# Patient Record
Sex: FEMALE | Race: WHITE | Employment: STUDENT
[De-identification: names, ages, dates, MRNs, and addresses within clinical notes are randomized per-mention and may not be internally consistent; named-entity substitution may affect disease eponyms.]

---

## 2017-06-10 ENCOUNTER — HEALTH MAINTENANCE LETTER (OUTPATIENT)
Age: 22
End: 2017-06-10

## 2017-09-14 ENCOUNTER — OFFICE VISIT (OUTPATIENT)
Dept: FAMILY MEDICINE | Facility: CLINIC | Age: 22
End: 2017-09-14

## 2017-09-14 VITALS
HEART RATE: 74 BPM | BODY MASS INDEX: 27.92 KG/M2 | WEIGHT: 183.6 LBS | TEMPERATURE: 98.6 F | OXYGEN SATURATION: 98 % | DIASTOLIC BLOOD PRESSURE: 76 MMHG | SYSTOLIC BLOOD PRESSURE: 127 MMHG | RESPIRATION RATE: 20 BRPM

## 2017-09-14 DIAGNOSIS — M79.675 TOE PAIN, LEFT: Primary | ICD-10-CM

## 2017-09-14 RX ORDER — NAPROXEN 500 MG/1
500 TABLET ORAL 2 TIMES DAILY WITH MEALS
Qty: 10 TABLET | Refills: 0 | Status: SHIPPED | OUTPATIENT
Start: 2017-09-14 | End: 2017-09-19

## 2017-09-14 ASSESSMENT — ENCOUNTER SYMPTOMS
FEVER: 0
CHILLS: 0
NUMBNESS: 0
ACTIVITY CHANGE: 0

## 2017-09-14 NOTE — PROGRESS NOTES
HPI:       Kenia De La Torre is a 22 year old who presents for the following  Patient presents with:  Toe Pain: Left great toe,inter,sharp, 1month    Kenia has had 1 month of pain around the left big toe, at the base of the toe and into the first knuckle. She has sharp, 6 out of 10 severity pain when walking or when dorsiflexing the toe, and a less severe, aching pain at night after having walked a lot. No history of trauma to this area, though she does walk a lot, typically 4-5 miles a day. She has tried taking ibuprofen, about once every three days, and this has helped somewhat with the aching pain but not with the sharper pain while walking. She's been walking less in the last couple of days, and the pain has gotten better. She has no personal or family history of joint disease other than a grandmother with bunions, and no history of psoriasis. She denies fever/chills, numbness/tingling, redness or swelling, and pain in any other joints.    Joint/Muscle Pain    Onset: 1 month ago    Injury?  no    Description:   Location(s): Left big toe 1st metatarsophalageal   Character: Sharp (when walking) and Dull ache    Intensity: moderate 6 at the worst, currently 0 or 1    Progression of Symptoms: got worse over the course of the month, better in the last couple days because she has decreased her walking    Accompanying Signs & Symptoms:  Other symptoms: none    History:   Previous similar pain: no      Worsened by    Overuse?: Yes Details: worse with walking    Morning Stiffness?:no, more aching at night    Alleviating factors:  Improved by: rest/inactivity and Ibuprofen  Therapies Tried and outcome: Ibuprofen, Details: Has helped with the aching pain after overuse, no effect on sharp pain during use    Problem, Medication and Allergy Lists were reviewed and are current.  Patient is an established patient of this clinic.         Review of Systems:   Review of Systems   Constitutional: Negative for  activity change, chills and fever.   HENT: Negative for congestion.    Skin: Negative for rash.   Neurological: Negative for numbness.             Physical Exam:     Patient Vitals for the past 24 hrs:   BP Temp Temp src Pulse Resp SpO2 Weight   09/14/17 1556 127/76 98.6  F (37  C) Oral 74 20 98 % 183 lb 9.6 oz (83.3 kg)     Body mass index is 27.92 kg/(m^2).  Vitals were reviewed and were normal     Physical Exam   Constitutional: She is oriented to person, place, and time. No distress.   Cardiovascular: Normal rate and regular rhythm.    Pulmonary/Chest: Effort normal. No respiratory distress.   Neurological: She is alert and oriented to person, place, and time.   Skin: Skin is warm and dry. No rash noted. No erythema.   Psychiatric: She has a normal mood and affect.      Left Ankle Exam   Swelling: None.    Range of Motion   Normal left ankle ROM  Dorsiflexion:     Normal  Plantar flexion: Normal  Inversion:         Normal  Eversion:         Normal    Muscle Strength   Normal left ankle strength      Left Great Toe Exam:  No gross deformities, no swelling or erythema, no joint effusion; normal range of motion and strength; no tenderness to palpation but some pain elicited on dorsiflexion        Results:     None    Assessment and Plan     ## Toe pain, left  Kenia presents with pain of the left great toe. This is likely due to overuse, and possibly tendinitis. Other possibilities would include gout or an infectious or rheumatologic arthropathy, but her physical exam findings, lack of associated symptoms or history, and young age make these very unlikely.  We prescribed a 1 week course of scheduled naproxen to hopefully alleviate some inflammation in the toe. If her symptoms do not improve, she will return to clinic for follow up and further evaluation; may consider imaging and lab work at that time.    - naproxen (NAPROSYN) 500 MG tablet; Take 1 tablet (500 mg) by mouth 2 times daily (with meals) for 5 days   Dispense: 10 tablet; Refill: 0    There are no discontinued medications.  Options for treatment and follow-up care were reviewed with the patient. Kenia De La Torre  engaged in the decision making process and verbalized understanding of the options discussed and agreed with the final plan.    This note is scribed by Leydi Cartwright MS3, on behalf of Dr. Bailon.    The medical student acted as scribe and the encounter documented above was completely performed by myself and the documentation reflects the work I have performed today.    Flora Bailon MD  [facility],  PGY-3 Family Medicine Resident  #[id number]

## 2017-09-14 NOTE — MR AVS SNAPSHOT
After Visit Summary   9/14/2017    Kenia De La Torre    MRN: [id number]           Patient Information     Date Of Birth          1995        Visit Information        Provider Department      9/14/2017 3:40 PM Flora Bailon MD [facility]        Today's Diagnoses     Toe pain, left    -  1      Care Instructions    Here is the plan from today's visit    1. Toe pain, left  - naproxen (NAPROSYN) 500 MG tablet; Take 1 tablet (500 mg) by mouth 2 times daily (with meals) for 5 days  Dispense: 10 tablet; Refill: 0    Please call or return to clinic if your symptoms don't go away.    Follow up plan  1 week with Dr. Bailon.    Thank you for coming to [facility] today.  Lab Testing:  **If you had lab testing today and your results are reassuring or normal they will be mailed to you or sent through [employer] within 7 days.   **If the lab tests need quick action we will call you with the results.  The phone number we will call with results is # [phone] (home) . If this is not the best number please call our clinic and change the number.  Medication Refills:  If you need any refills please call your pharmacy and they will contact us.   If you need to  your refill at a new pharmacy, please contact the new pharmacy directly. The new pharmacy will help you get your medications transferred faster.   Scheduling:  If you have any concerns about today's visit or wish to schedule another appointment please call our office during normal business hours [phone] (8-5:00 M-F)  If a referral was made to a [facility] and you don't get a call from central scheduling please call [phone].  If a Mammogram was ordered for you at [facility] call [phone] to schedule or change your appointment.  If you had an XRay/CT/Ultrasound/MRI ordered the number is [phone] to schedule or change your radiology appointment.   Medical  Concerns:  If you have urgent medical concerns please call [phone] at any time of the day.            Follow-ups after your visit        Who to contact     Please call your clinic at [phone] to:    Ask questions about your health    Make or cancel appointments    Discuss your medicines    Learn about your test results    Speak to your doctor   If you have compliments or concerns about an experience at your clinic, or if you wish to file a complaint, please contact [facility] Patient Relations at [phone] or email us at [email or web address]         Additional Information About Your Visit        [employer]olution[employer]FO[employer] Information     Kiddy is an electronic gateway that provides easy, online access to your medical records. With Kiddy, you can request a clinic appointment, read your test results, renew a prescription or communicate with your care team.     To sign up for Kiddy visit the website at [email or web address]   You will be asked to enter the access code listed below, as well as some personal information. Please follow the directions to create your username and password.     Your access code is: [email or web address]  Expires: 2017  4:20 PM     Your access code will  in 90 days. If you need help or a new code, please contact your [facility] or call [phone] for assistance.        Care EveryWhere ID     This is your Care EveryWhere ID. This could be used by other organizations to access your [facility] medical records  [id number]        Your Vitals Were     Pulse Temperature Respirations Pulse Oximetry BMI (Body Mass Index)       74 98.6  F (37  C) (Oral) 20 98% 27.92 kg/m2        Blood Pressure from Last 3 Encounters:   17 127/76   01/27/15 114/73   14 127/77    Weight from Last 3 Encounters:   17 183 lb 9.6 oz (83.3 kg)   01/27/15 176 lb 6.4 oz (80 kg) (94 %)*   14 180 lb (81.6 kg) (95 %)*     *  Growth percentiles are based on [place] 2-20 Years data.              Today, you had the following     No orders found for display         Today's Medication Changes          These changes are accurate as of: 9/14/17  4:20 PM.  If you have any questions, ask your nurse or doctor.               Start taking these medicines.        Dose/Directions    naproxen 500 MG tablet   Commonly known as:  NAPROSYN   Used for:  Toe pain, left   Started by:  Flora Bailon MD        Dose:  500 mg   Take 1 tablet (500 mg) by mouth 2 times daily (with meals) for 5 days   Quantity:  10 tablet   Refills:  0            Where to get your medicines      These medications were sent to [id number] - [place], [place] - [address] AT [facility] [place] [address]  [address], [place] [address]    Hours:  24-hours Phone:  [phone]     naproxen 500 MG tablet                Primary Care Provider Office Phone # Fax #    Flora Bailon [phone]       [facility] [address] [facility] [address]        Equal Access to Services     ESTHER BOSTON AH: Hadii eh wheatley Soelsa, waaxda lumayank, qaybta kaalmanubia guardado, jose angel sosa . So [facility] [phone].    ATENCIÓN: Si habla español, tiene a castro disposición servicios gratkatianaos de asistencia lingüística. [facility] [phone].    We comply with applicable federal civil rights laws and [place] laws. We do not discriminate on the basis of race, color, national origin, age, disability sex, sexual orientation or gender identity.            Thank you!     Thank you for choosing [facility]  for your care. Our goal is always to provide you with excellent care. Hearing back from our patients is one way we can continue to improve our services. Please take a few minutes to complete the written survey that you may receive in the mail after your visit with us. Thank you!              Your Updated Medication List - Protect others around you: Learn how to safely use, store and throw away your medicines at www.disposemymeds.org.          This list is accurate as of: 9/14/17  4:20 PM.  Always use your most recent med list.                   Brand Name Dispense Instructions for use Diagnosis    drospirenone-ethinyl estradiol 3-0.02 MG per tablet    LEIGHTON     Take 1 tablet by mouth daily        naproxen 500 MG tablet    NAPROSYN    10 tablet    Take 1 tablet (500 mg) by mouth 2 times daily (with meals) for 5 days    Toe pain, left

## 2017-09-14 NOTE — PROGRESS NOTES
Preceptor Attestation:   Patient seen and discussed with the resident.   Assessment and plan reviewed with resident and agreed upon.   Supervising Physician:  Antoine Castellano MD  [facility]

## 2017-09-14 NOTE — PATIENT INSTRUCTIONS
Here is the plan from today's visit    1. Toe pain, left  - naproxen (NAPROSYN) 500 MG tablet; Take 1 tablet (500 mg) by mouth 2 times daily (with meals) for 5 days  Dispense: 10 tablet; Refill: 0    Please call or return to clinic if your symptoms don't go away.    Follow up plan  1 week with Dr. Bailon.    Thank you for coming to [facility] today.  Lab Testing:  **If you had lab testing today and your results are reassuring or normal they will be mailed to you or sent through [employer] within 7 days.   **If the lab tests need quick action we will call you with the results.  The phone number we will call with results is # [phone] (home) . If this is not the best number please call our clinic and change the number.  Medication Refills:  If you need any refills please call your pharmacy and they will contact us.   If you need to  your refill at a new pharmacy, please contact the new pharmacy directly. The new pharmacy will help you get your medications transferred faster.   Scheduling:  If you have any concerns about today's visit or wish to schedule another appointment please call our office during normal business hours [phone] (8-5:00 M-F)  If a referral was made to a [facility] and you don't get a call from central scheduling please call [phone].  If a Mammogram was ordered for you at [facility] call [phone] to schedule or change your appointment.  If you had an XRay/CT/Ultrasound/MRI ordered the number is [phone] to schedule or change your radiology appointment.   Medical Concerns:  If you have urgent medical concerns please call [phone] at any time of the day.

## 2017-09-15 PROBLEM — M79.675 PAIN OF TOE OF LEFT FOOT: Status: ACTIVE | Noted: 2017-09-15

## 2020-01-28 ENCOUNTER — OFFICE VISIT - HEALTHEAST (OUTPATIENT)
Dept: FAMILY MEDICINE | Facility: CLINIC | Age: 25
End: 2020-01-28

## 2020-01-28 DIAGNOSIS — F33.8 SEASONAL AFFECTIVE DISORDER (H): ICD-10-CM

## 2020-01-28 ASSESSMENT — ANXIETY QUESTIONNAIRES
6. BECOMING EASILY ANNOYED OR IRRITABLE: SEVERAL DAYS
IF YOU CHECKED OFF ANY PROBLEMS ON THIS QUESTIONNAIRE, HOW DIFFICULT HAVE THESE PROBLEMS MADE IT FOR YOU TO DO YOUR WORK, TAKE CARE OF THINGS AT HOME, OR GET ALONG WITH OTHER PEOPLE: SOMEWHAT DIFFICULT
1. FEELING NERVOUS, ANXIOUS, OR ON EDGE: MORE THAN HALF THE DAYS
5. BEING SO RESTLESS THAT IT IS HARD TO SIT STILL: NOT AT ALL
7. FEELING AFRAID AS IF SOMETHING AWFUL MIGHT HAPPEN: NOT AT ALL
2. NOT BEING ABLE TO STOP OR CONTROL WORRYING: MORE THAN HALF THE DAYS
3. WORRYING TOO MUCH ABOUT DIFFERENT THINGS: MORE THAN HALF THE DAYS
GAD7 TOTAL SCORE: 9
4. TROUBLE RELAXING: MORE THAN HALF THE DAYS

## 2020-01-28 ASSESSMENT — PATIENT HEALTH QUESTIONNAIRE - PHQ9: SUM OF ALL RESPONSES TO PHQ QUESTIONS 1-9: 6

## 2020-01-28 ASSESSMENT — MIFFLIN-ST. JEOR: SCORE: 1590.18

## 2020-05-24 ENCOUNTER — COMMUNICATION - HEALTHEAST (OUTPATIENT)
Dept: FAMILY MEDICINE | Facility: CLINIC | Age: 25
End: 2020-05-24

## 2020-05-24 DIAGNOSIS — F33.8 SEASONAL AFFECTIVE DISORDER (H): ICD-10-CM

## 2020-12-04 ENCOUNTER — COMMUNICATION - HEALTHEAST (OUTPATIENT)
Dept: FAMILY MEDICINE | Facility: CLINIC | Age: 25
End: 2020-12-04

## 2020-12-04 DIAGNOSIS — F33.8 SEASONAL AFFECTIVE DISORDER (H): ICD-10-CM

## 2021-05-27 ASSESSMENT — PATIENT HEALTH QUESTIONNAIRE - PHQ9: SUM OF ALL RESPONSES TO PHQ QUESTIONS 1-9: 6

## 2021-05-28 ASSESSMENT — ANXIETY QUESTIONNAIRES: GAD7 TOTAL SCORE: 9

## 2021-05-31 ENCOUNTER — RECORDS - HEALTHEAST (OUTPATIENT)
Dept: FAMILY MEDICINE | Facility: CLINIC | Age: 26
End: 2021-05-31

## 2021-06-04 VITALS
SYSTOLIC BLOOD PRESSURE: 121 MMHG | WEIGHT: 175.25 LBS | HEART RATE: 70 BPM | OXYGEN SATURATION: 100 % | HEIGHT: 68 IN | DIASTOLIC BLOOD PRESSURE: 70 MMHG | BODY MASS INDEX: 26.56 KG/M2

## 2021-06-05 NOTE — PROGRESS NOTES
"OFFICE VISIT - FAMILY MEDICINE     ASSESSMENT AND PLAN     1. Seasonal affective disorder (H)  sertraline (ZOLOFT) 50 MG tablet   Mild seasonal affective disorder disease, seems to be getting worse lately, we discussed different ways to improve her symptoms included medication, we did review possible side effect, family member seems to be responding to SSRI, especially sertraline, will start with a low dose, slowly increased as prescribed and as tolerated, have her follow-up in 6 weeks for reevaluation.  Depression action plan discussed and copy given to patient.    CHIEF COMPLAINT   Establish Care and Mental Health Problem (discuss mental health concerns with general depression. Doesn't feel good as she should be-seasonal.)    HPI   Kenia Torres is a 24 y.o. female.  No Patient Care Coordination Note on file.    Long-lasting history of mood disorder, primarily mild seasonal depression symptoms, usually manifest with overeating, difficulty sleeping, low energy, symptoms usually peaks  after the holiday season, in the middle of the winter; tends to improve in the late spring early summer, sometime associated with anxiety type symptoms warrant something worse will happen to her or her family.  She works in retail, usually busy during the holiday season; has never been on the medication before.  Has tried to do some walking 2 to 4 miles a days, does not seem to be helping anymore.  Family history is positive for depression, mom that and her brothers.  They are all taking sertraline with good response.  Mom also has diabetes type 1.  Dad has high blood pressure.  Patient is a non-smoker, drinks about 4 alcoholic per weeks, denies any illicit drug use.    Review of Systems As per HPI, otherwise negative.    OBJECTIVE   /70 (Patient Site: Left Arm, Patient Position: Sitting, Cuff Size: Adult Regular)   Pulse 70   Ht 5' 8.11\" (1.73 m)   Wt 175 lb 4 oz (79.5 kg)   SpO2 100%   BMI 26.56 kg/m  "   Physical Exam   Constitutional: She is oriented to person, place, and time. She appears well-developed and well-nourished.   HENT:   Head: Normocephalic and atraumatic.   Neck: Normal range of motion. Neck supple. No JVD present. No tracheal deviation present. No thyromegaly present.   Cardiovascular: Normal rate, regular rhythm, normal heart sounds and intact distal pulses. Exam reveals no gallop and no friction rub.   No murmur heard.  Pulmonary/Chest: Effort normal and breath sounds normal. No respiratory distress. She has no wheezes. She has no rales.   Musculoskeletal:         General: No tenderness or edema.   Lymphadenopathy:     She has no cervical adenopathy.   Neurological: She is alert and oriented to person, place, and time. Coordination normal.   Psychiatric: She has a normal mood and affect. Judgment and thought content normal.       PFSH   No family history on file.  Social History     Socioeconomic History     Marital status: Single     Spouse name: Not on file     Number of children: Not on file     Years of education: Not on file     Highest education level: Not on file   Occupational History     Not on file   Social Needs     Financial resource strain: Not on file     Food insecurity:     Worry: Not on file     Inability: Not on file     Transportation needs:     Medical: Not on file     Non-medical: Not on file   Tobacco Use     Smoking status: Never Smoker     Smokeless tobacco: Never Used   Substance and Sexual Activity     Alcohol use: Yes     Frequency: 4 or more times a week     Drug use: Never     Sexual activity: Not on file   Lifestyle     Physical activity:     Days per week: Not on file     Minutes per session: Not on file     Stress: Not on file   Relationships     Social connections:     Talks on phone: Not on file     Gets together: Not on file     Attends Orthodoxy service: Not on file     Active member of club or organization: Not on file     Attends meetings of clubs or  organizations: Not on file     Relationship status: Not on file     Intimate partner violence:     Fear of current or ex partner: Not on file     Emotionally abused: Not on file     Physically abused: Not on file     Forced sexual activity: Not on file   Other Topics Concern     Not on file   Social History Narrative     Not on file     Relevant history was reviewed with the patient today, unless noted in HPI, nothing is pertinent for this visit.  [facility]   There are no active problems to display for this patient.    No past surgical history on file.    RESULTS/CONSULTS (Lab/Rad)   No results found for this or any previous visit (from the past 168 hour(s)).  No results found.  MEDICATIONS     Current Outpatient Medications on File Prior to Visit   Medication Sig Dispense Refill     ergocalciferol, vitamin D2, (VITAMIN D2 ORAL) Take by mouth.       etonogestrel (IMPLANON SDRM) by Subdermal route. Put in 6 months ago at [facility]       ibuprofen (ADVIL,MOTRIN) 200 MG tablet Take 200 mg by mouth every 6 (six) hours as needed for pain.       melatonin 3 mg Tab tablet Take 3 mg by mouth at bedtime as needed.       No current facility-administered medications on file prior to visit.        HEALTH MAINTENANCE / SCREENING   PHQ-2 Total Score: 2 (1/28/2020 11:00 AM)  , PHQ-9 Total Score: 6 (1/28/2020 11:00 AM)  ,QUINTIN 7 Total Score: 9 (1/28/2020 11:00 AM)    Immunization History   Administered Date(s) Administered     HIB (HBOC) 1995     Influenza,seasonal,quad inj =/> 6months 11/14/2019     Health Maintenance   Topic     PREVENTIVE CARE VISIT      HPV VACCINES (1 - Female 2-dose series)     HIV SCREENING      CHLAMYDIA SCREENING      TD 18+ HE      TDAP ADULT ONE TIME DOSE      PAP SMEAR      ADVANCE CARE PLANNING      INFLUENZA VACCINE RULE BASED        Charissa Brennan MD  Family Medicine, [facility]     This note was dictated using a voice recognition software.  Any grammatical or  context distortion are unintentional and inherent to the software.

## 2021-06-08 NOTE — TELEPHONE ENCOUNTER
Who is calling:  Patient   Reason for Call:  Patient is calling for follow up on refill request below patient states she is out of medication requesting to process as soon as possible .  Date of last appointment with primary care: 01/28/20  Okay to leave a detailed message: No

## 2021-06-08 NOTE — TELEPHONE ENCOUNTER
Refill Approved    Rx renewed per Medication Renewal Policy. Medication was last renewed on 1/28/20.    Whit Isidro, [facility] Triage/Med Refill 5/26/2020     Requested Prescriptions   Pending Prescriptions Disp Refills     sertraline (ZOLOFT) 50 MG tablet [Pharmacy Med Name: SERTRALINE 50MG TABLETS] 60 tablet 1     Sig: START WITH 1/2 TABLET(25 MG) BY MOUTH DAILY, THEN INCREASE AS DIRECTED TO A MAXIMUM OF 3 TABLETS(75 MG) DAILY       SSRI Refill Protocol  Passed - 5/26/2020 11:10 AM        Passed - PCP or prescribing provider visit in last year     Last office visit with prescriber/PCP: 1/28/2020 Charissa Brennan MD OR same dept: 1/28/2020 Charissa Brennan MD OR same specialty: 1/28/2020 Charissa Brennan MD  Last physical: Visit date not found Last MTM visit: Visit date not found   Next visit within 3 mo: Visit date not found  Next physical within 3 mo: Visit date not found  Prescriber OR PCP: Charissa Brennan MD  Last diagnosis associated with med order: 1. Seasonal affective disorder (H)  - sertraline (ZOLOFT) 50 MG tablet [Pharmacy Med Name: SERTRALINE 50MG TABLETS]; START WITH 1/2 TABLET(25 MG) BY MOUTH DAILY, THEN INCREASE AS DIRECTED TO A MAXIMUM OF 3 TABLETS(75 MG) DAILY  Dispense: 60 tablet; Refill: 1    If protocol passes may refill for 12 months if within 3 months of last provider visit (or a total of 15 months).

## 2021-06-18 NOTE — PATIENT INSTRUCTIONS - HE
Patient Instructions by Charissa Brennan MD at 1/28/2020 10:20 AM     Author: Charissa Brennna MD Service: -- Author Type: Physician    Filed: 1/30/2020  2:41 PM Encounter Date: 1/28/2020 Status: Signed    : Charissa Brennan MD (Physician)           Patient Education     Depression: Tips to Help Yourself    As your healthcare providers help treat your depression, you can also help yourself. Keep in mind that your illness affects you emotionally, physically, mentally, and socially. So full recovery will take time. Take care of your body and your soul, and be patient with yourself as you get better.  Self-care    Educate yourself. Read about treatment and medicine options. If you have the energy, attend local conferences or support groups. Keep a list of useful websites and helpful books and use them as needed. This illness is not your fault. Dont blame yourself for your depression.    Manage early symptoms. If you notice symptoms returning, experience triggers, or identify other factors that may lead to a depressive episode, get help as soon as possible. Ask trusted friends and family to monitor your behavior and let you know if they see anything of concern.    Work with your provider. Find a provider you can trust. Communicate honestly with that person and share information on your treatment for depression and your reaction to medicines.    Be prepared for a crisis. Know what to do if you experience a crisis. Keep the phone number of a crisis hotline and know the location of your community's urgent care centers and the closest emergency department.    Hold off on big decisions. Depression can cloud your judgment. So wait until you feel better before making major life decisions, such as changing jobs, moving, or getting  or .    Be patient. Recovering from depression is a process. Dont be discouraged if it takes some time to feel better.    Keep it simple. Depression  saps your energy and concentration. So you wont be able to do all the things you used to do. Set small goals and do what you can.    Be with others. Dont isolate yourself--youll only feel worse. Try to be with other people. And take part in fun activities when you can. Go to a movie, ballgame, [employer] service, or social event. Talk openly with people you can trust. And accept help when its offered.  Take care of your body  People with depression often lose the desire to take care of themselves. That only makes their problems worse. During treatment and afterward, make a point to:    Exercise. Its a great way to take care of your body. And studies have shown that exercise helps fight depression.    Avoid drugs and alcohol. These may ease the pain in the short term. But theyll only make your problems worse in the long run.    Get relief from stress. Ask your healthcare provider for relaxation exercises and techniques to help relieve stress.    Eat right. A balanced and healthy diet helps keep your body healthy.  Date Last Reviewed: 1/1/2017 2000-2019 [employer]. [address] [place] [address], [place] [address]. All rights reserved. This information is not intended as a substitute for professional medical care. Always follow your healthcare professional's instructions.

## 2021-06-20 NOTE — LETTER
Letter by Charissa Brennan MD at      Author: Charissa Brennan MD Service: -- Author Type: --    Filed:  Encounter Date: 1/28/2020 Status: (Other)                    My Depression Action Plan  Name: Kenia Torres   Date of Birth 1995  Date: 1/28/2020    My Doctor: Charissa Brennan MD   My Clinic: [facility]  [address] [facility] [address]  [phone]          GREEN    ZONE   Good Control    What it looks like:     Things are going generally well. You have normal ups and downs. You may even feel depressed from time to time, but bad moods usually last less than a day.   What you need to do:  1. Continue to care for yourself (see self care plan)  2. Check your depression survival kit and update it as needed  3. Follow your physicians recommendations including any medication.  4. Do not stop taking medication unless you consult with your physician first.           YELLOW         ZONE Getting Worse    What it looks like:     Depression is starting to interfere with your life.     It may be hard to get out of bed; you may be starting to isolate yourself from others.    Symptoms of depression are starting to last most all day and this has happened for several days.     You may have suicidal thoughts but they are not constant.   What you need to do:     1. Call your care team. Your response to treatment will improve if you keep your care team informed of your progress. Yellow periods are signs an adjustment may need to be made.     2. Continue your self-care.  Just get dressed and ready for the day.  Don't give yourself time to talk yourself out of it.    3. Talk to someone in your support network.    4. Open up your depression Depression Self-Care Plan / Wellness kit.           RED    ZONE Medical Alert - Get Help    What it looks like:     Depression is seriously interfering with your life.     You may experience these or other symptoms: You  cant get out of bed most days, cant work or engage in other necessary activities, you have trouble taking care of basic hygiene, or basic responsibilities, thoughts of suicide or death that will not go away, self-injurious behavior.     What you need to do:  1. Call your care team and request a same-day appointment. If they are not available (weekends or after hours) call your local crisis line, emergency room or 911.            Self-Care Plan / Wellness Kit    Self-Care for Depression  Heres the deal. Your body and mind are really not as separate as most people think.  What you do and think affects how you feel and how you feel influences what you do and think. This means if you do things that people who feel good do, it will help you feel better.  Sometimes this is all it takes.  There is also a place for medication and therapy depending on how severe your depression is, so be sure to consult with your medical provider and/ or Behavioral Health Consultant if your symptoms are worsening or not improving.     In order to better manage my stress, I will:    Exercise  Get some form of exercise, every day. This will help reduce pain and release endorphins, the feel good chemicals in your brain. This is almost as good as taking antidepressants!  This is not the same as joining a gym and then never going! (they count on that by the way?) It can be as simple as just going for a walk or doing some gardening, anything that will get you moving.      Hygiene   Maintain good hygiene (get out of bed in the morning, make your bed, brush your teeth, take a shower, and get dressed like you were going to work, even if you are unemployed).  If your clothes don't fit try to get ones that do.    Diet  Strive to eat foods that are good for me, drink plenty of water, and avoid excessive sugar, caffeine, alcohol, and other mood-altering substances.  Some foods that are helpful in depression are: complex carbohydrates, B vitamins,  flaxseed, fish or fish oil, fresh fruits and vegetables.    Psychotherapy  Agree to participate in Individual Therapy (if recommended).    Medication  If prescribed medications, I agree to take them.  Missing doses can result in serious side effects.  I understand that drinking alcohol, or other illicit drug use, may cause potential side effects.  I will not stop my medication abruptly without first discussing it with my provider.    Staying Connected With Others  Stay in touch with my friends, family members, and my primary care provider/team.    Use your imagination  Be creative.  We all have a creative side; it doesnt matter if its oil painting, sand castles, or mud pies! This will also kick up the endorphins.    Witness Beauty  (AKA stop and smell the roses) Take a look outside, even in mid-winter. Notice colors, textures. Watch the squirrels and birds.     Service to others  Be of service to others.  There is always someone else in need.  By helping others we can get out of ourselves and remember the really important things.  This also provides opportunities for practicing all the other parts of the program.    Humor  Laugh and be silly!  Adjust your TV habits for less news and crime-drama and more comedy.    Control your stress  Try breathing deep, massage therapy, biofeedback, and meditation. Find time to relax each day.     Crisis Text Line  http://www.crisistextline.org    The Crisis Text Line serves anyone, in any type of crisis, providing access to free, 24/7 support and information via the medium people already use and trust:    Here's how it works:  1.  Text 483-640 from anywhere in the [place], anytime, about any type of crisis.  2.  A live, trained Crisis Counselor receives the text and responds quickly.  3.  The volunteer Crisis Counselor will help you move from a 'hot moment to a cool moment'.  My support system    Clinic Contact:  Phone number:    Contact 1:  Phone number:    Contact 2:  Phone number:     Religion/:  Phone number:    Therapist:  Phone number:    [facility] crisis center:    Phone number:    Other community support:  Phone number:

## 2021-06-25 NOTE — TELEPHONE ENCOUNTER
RN cannot approve Refill Request    RN can NOT refill this medication PCP messaged that patient is overdue for Office Visit. Last office visit: Visit date not found Last Physical: Visit date not found Last MTM visit: Visit date not found Last visit same specialty: 1/28/2020 Charissa Brennan MD.  Next visit within 3 mo: Visit date not found  Next physical within 3 mo: Visit date not found      Lori Morris, [facility] Triage/Med Refill 5/31/2021    Requested Prescriptions   Pending Prescriptions Disp Refills     sertraline (ZOLOFT) 50 MG tablet [Pharmacy Med Name: SERTRALINE 50MG TABLETS] 270 tablet 1     Sig: TAKE ONE-HALF TABLET BY MOUTH DAILY, THEN INCREASE AS DIRECTED TO A MAX OF 3 TABLETS BY MOUTH DAILY       SSRI Refill Protocol  Failed - 5/31/2021  3:24 AM        Failed - PCP or prescribing provider visit in last year     Last office visit with prescriber/PCP: Visit date not found OR same dept: Visit date not found OR same specialty: 1/28/2020 Charissa Brennan MD  Last physical: Visit date not found Last MTM visit: Visit date not found   Next visit within 3 mo: Visit date not found  Next physical within 3 mo: Visit date not found  Prescriber OR PCP: Lorrie Bolivar MD  Last diagnosis associated with med order: 1. Seasonal affective disorder (H)  - sertraline (ZOLOFT) 50 MG tablet [Pharmacy Med Name: SERTRALINE 50MG TABLETS]; TAKE ONE-HALF TABLET BY MOUTH DAILY, THEN INCREASE AS DIRECTED TO A MAX OF 3 TABLETS BY MOUTH DAILY  Dispense: 270 tablet; Refill: 1    If protocol passes may refill for 12 months if within 3 months of last provider visit (or a total of 15 months).

## 2021-12-28 ENCOUNTER — VIRTUAL VISIT (OUTPATIENT)
Dept: FAMILY MEDICINE | Facility: CLINIC | Age: 26
End: 2021-12-28
Payer: COMMERCIAL

## 2021-12-28 DIAGNOSIS — F33.8 SEASONAL AFFECTIVE DISORDER (H): ICD-10-CM

## 2021-12-28 PROCEDURE — 99213 OFFICE O/P EST LOW 20 MIN: CPT | Mod: TEL | Performed by: FAMILY MEDICINE

## 2021-12-28 NOTE — PROGRESS NOTES
"Faina is a 26 year old who is being evaluated via a billable telephone visit.      What phone number would you like to be contacted at? [phone]   How would you like to obtain your AVS? Mail a copy    Assessment & Plan     Seasonal affective disorder (H)    - sertraline (ZOLOFT) 50 MG tablet; Take 2 tabs PO daily  Mild seasonal affective disorder, controlled with sertraline, overall doing fine no adverse reaction from the medication, long-term management discussed, could plan on slowly tapering down as symptoms continue to improve.  We will plan on Having her come  in 2022 for general physical.    Review of external notes as documented elsewhere in note  11 minutes spent on the date of the encounter doing chart review, review of outside records, review of test results, interpretation of tests, patient visit and documentation        MEDICATIONS:  Continue current medications without change    No follow-ups on file.    Charissa Brennan MD  [facility]    Subjective   Faina is a 26 year old who presents for the following health issues     HPI   Following up on seasonal affective disorder, has been on sertraline for almost a couple of years, is noticing some benefit of controlling his symptoms.  Denies any suicidal thoughts. \" Medication is not fantastic but does help\", currently taking sertraline 50 mg 2 tablets daily.  Tolerating well no adverse reaction;she has been very busy at work lately.    Review of Systems   Constitutional, HEENT, cardiovascular, pulmonary, gi and gu systems are negative, except as otherwise noted.      Objective           Vitals:  No vitals were obtained today due to virtual visit.    Physical Exam   healthy, alert and no distress  PSYCH: Alert and oriented times 3; coherent speech, normal   rate and volume, able to articulate logical thoughts, able   to abstract reason, no tangential thoughts, no hallucinations   or delusions  Her affect is normal  RESP: No cough, no " audible wheezing, able to talk in full sentences  Remainder of exam unable to be completed due to telephone visits                Phone call duration: 11 minutes

## 2022-01-30 ENCOUNTER — HEALTH MAINTENANCE LETTER (OUTPATIENT)
Age: 27
End: 2022-01-30

## 2022-04-24 DIAGNOSIS — F33.8 SEASONAL AFFECTIVE DISORDER (H): ICD-10-CM

## 2022-04-27 NOTE — TELEPHONE ENCOUNTER
"Routing refill request to provider for review/approval because:  PHQ-9    Last Written Prescription Date:  12/28/2021  Last Fill Quantity: 60,  # refills: 3   Last office visit provider:  12/28/2021     Requested Prescriptions   Pending Prescriptions Disp Refills     sertraline (ZOLOFT) 50 MG tablet [Pharmacy Med Name: sertraline 50 mg tablet] 60 tablet 2     Sig: TAKE TWO TABLETS BY MOUTH EVERY DAY       SSRIs Protocol Failed - 4/27/2022 10:21 AM        Failed - PHQ-9 score less than 5 in past 6 months     Please review last PHQ-9 score.           Passed - Medication is active on med list        Passed - Patient is age 18 or older        Passed - No active pregnancy on record        Passed - No positive pregnancy test in last 12 months        Passed - Recent (6 mo) or future (30 days) visit within the authorizing provider's specialty     Patient had office visit in the last 6 months or has a visit in the next 30 days with authorizing provider or within the authorizing provider's specialty.  See \"Patient Info\" tab in inbasket, or \"Choose Columns\" in Meds & Orders section of the refill encounter.                 Sara Lira RN 04/27/22 10:22 AM    "

## 2022-07-30 DIAGNOSIS — F33.8 SEASONAL AFFECTIVE DISORDER (H): ICD-10-CM

## 2022-08-01 NOTE — TELEPHONE ENCOUNTER
"Routing refill request to provider for review/approval because:  phq 9    Last Written Prescription Date:  4/27/22  Last Fill Quantity: 60,  # refills: 2   Last office visit provider:  12/28/21     Requested Prescriptions   Pending Prescriptions Disp Refills     sertraline (ZOLOFT) 50 MG tablet [Pharmacy Med Name: sertraline 50 mg tablet] 60 tablet 2     Sig: TAKE TWO TABLETS BY MOUTH EVERY DAY       SSRIs Protocol Failed - 7/30/2022 12:03 PM        Failed - PHQ-9 score less than 5 in past 6 months     Please review last PHQ-9 score.           Failed - Recent (6 mo) or future (30 days) visit within the authorizing provider's specialty     Patient had office visit in the last 6 months or has a visit in the next 30 days with authorizing provider or within the authorizing provider's specialty.  See \"Patient Info\" tab in inbasket, or \"Choose Columns\" in Meds & Orders section of the refill encounter.            Passed - Medication is active on med list        Passed - Patient is age 18 or older        Passed - No active pregnancy on record        Passed - No positive pregnancy test in last 12 months             Whit Isidro, RN 07/31/22 7:33 PM  "

## 2022-09-25 ENCOUNTER — HEALTH MAINTENANCE LETTER (OUTPATIENT)
Age: 27
End: 2022-09-25

## 2022-10-26 DIAGNOSIS — F33.8 SEASONAL AFFECTIVE DISORDER (H): ICD-10-CM

## 2022-10-27 NOTE — TELEPHONE ENCOUNTER
"Routing refill request to provider for review/approval because:  phq 9    Last Written Prescription Date:  8/1/22  Last Fill Quantity: 60,  # refills: 2   Last office visit provider:  12/28/21     Requested Prescriptions   Pending Prescriptions Disp Refills     sertraline (ZOLOFT) 50 MG tablet [Pharmacy Med Name: sertraline 50 mg tablet] 60 tablet 2     Sig: TAKE 2 TABLETS BY MOUTH EVERY DAY       SSRIs Protocol Failed - 10/26/2022  3:48 PM        Failed - PHQ-9 score less than 5 in past 6 months     Please review last PHQ-9 score.           Failed - Recent (6 mo) or future (30 days) visit within the authorizing provider's specialty     Patient had office visit in the last 6 months or has a visit in the next 30 days with authorizing provider or within the authorizing provider's specialty.  See \"Patient Info\" tab in inbasket, or \"Choose Columns\" in Meds & Orders section of the refill encounter.            Passed - Medication is active on med list        Passed - Patient is age 18 or older        Passed - No active pregnancy on record        Passed - No positive pregnancy test in last 12 months             Whit Isidro, RN 10/27/22 12:34 PM  "

## 2023-01-30 DIAGNOSIS — F33.8 SEASONAL AFFECTIVE DISORDER (H): ICD-10-CM

## 2023-01-31 NOTE — TELEPHONE ENCOUNTER
"Routing refill request to provider for review/approval because:  phq 9    Last Written Prescription Date:  10/27/222  Last Fill Quantity: 60,  # refills: 2   Last office visit provider:  12/28/21     Requested Prescriptions   Pending Prescriptions Disp Refills     sertraline (ZOLOFT) 50 MG tablet [Pharmacy Med Name: sertraline 50 mg tablet] 60 tablet 2     Sig: TAKE 2 TABLETS BY MOUTH EVERY DAY       SSRIs Protocol Failed - 1/30/2023  8:30 AM        Failed - PHQ-9 score less than 5 in past 6 months     Please review last PHQ-9 score.           Failed - Recent (6 mo) or future (30 days) visit within the authorizing provider's specialty     Patient had office visit in the last 6 months or has a visit in the next 30 days with authorizing provider or within the authorizing provider's specialty.  See \"Patient Info\" tab in inbasket, or \"Choose Columns\" in Meds & Orders section of the refill encounter.            Passed - Medication is active on med list        Passed - Patient is age 18 or older        Passed - No active pregnancy on record        Passed - No positive pregnancy test in last 12 months             Whit Isidro, RN 01/30/23 7:16 PM  "

## 2023-02-01 NOTE — TELEPHONE ENCOUNTER
Pt calling back to check on the status of this refill. Please send RX to pharmacy on file. Thanks!

## 2023-04-18 DIAGNOSIS — F33.8 SEASONAL AFFECTIVE DISORDER (H): ICD-10-CM

## 2023-04-18 NOTE — LETTER
[facility]  [address] [place] [address]  [phone]             April 19, 2023    Kenia De La Torre  [address] [place] [address]              Dear Kenia,    We care about your health.  Upon a review of your chart we have uncovered that you are overdue for a depression screening.  Enclosed in this letter is the depression screening tool.  Please fill it out and call our office to schedule either a telephone visit or an office visit so that we can review the results and check in with you.  We can be reached at the number listed above.    Thank you for the opportunity to care for you.  We look forward to hearing from you soon.    Sincerely,         Charissa Brennan MD

## 2023-04-18 NOTE — TELEPHONE ENCOUNTER
"Routing refill request to provider for review/approval because:  phq 9    Last Written Prescription Date:  2/1/23  Last Fill Quantity: 60,  # refills: 2   Last office visit provider:  12/28/21     Requested Prescriptions   Pending Prescriptions Disp Refills     sertraline (ZOLOFT) 50 MG tablet [Pharmacy Med Name: sertraline 50 mg tablet] 60 tablet 2     Sig: TAKE 2 TABLETS BY MOUTH EVERY DAY       SSRIs Protocol Failed - 4/18/2023  8:04 AM        Failed - PHQ-9 score less than 5 in past 6 months     Please review last PHQ-9 score.           Failed - Recent (6 mo) or future (30 days) visit within the authorizing provider's specialty     Patient had office visit in the last 6 months or has a visit in the next 30 days with authorizing provider or within the authorizing provider's specialty.  See \"Patient Info\" tab in inbasket, or \"Choose Columns\" in Meds & Orders section of the refill encounter.            Passed - Medication is active on med list        Passed - Patient is age 18 or older        Passed - No active pregnancy on record        Passed - No positive pregnancy test in last 12 months             Whit Isidro, RN 04/18/23 6:02 PM  "

## 2023-05-08 ENCOUNTER — HEALTH MAINTENANCE LETTER (OUTPATIENT)
Age: 28
End: 2023-05-08

## 2023-07-21 DIAGNOSIS — F33.8 SEASONAL AFFECTIVE DISORDER (H): ICD-10-CM

## 2023-07-21 NOTE — TELEPHONE ENCOUNTER
"Routing refill request to provider for review/approval because:  PHQ9 not current  Patient needs to be seen because it has been more than 6 months since last office visit.    Last Written Prescription Date:  04/19/2023  Last Fill Quantity: 60,  # refills: 2   Last office visit provider:  12/28/2021     Requested Prescriptions   Pending Prescriptions Disp Refills     sertraline (ZOLOFT) 50 MG tablet [Pharmacy Med Name: sertraline 50 mg tablet] 60 tablet 2     Sig: TAKE 2 TABLETS BY MOUTH EVERY DAY       SSRIs Protocol Failed - 7/21/2023 10:56 AM        Failed - PHQ-9 score less than 5 in past 6 months     Please review last PHQ-9 score.           Failed - Recent (6 mo) or future (30 days) visit within the authorizing provider's specialty     Patient had office visit in the last 6 months or has a visit in the next 30 days with authorizing provider or within the authorizing provider's specialty.  See \"Patient Info\" tab in inbasket, or \"Choose Columns\" in Meds & Orders section of the refill encounter.            Passed - Medication is active on med list        Passed - Patient is age 18 or older        Passed - No active pregnancy on record        Passed - No positive pregnancy test in last 12 months             Marylu Earl RN 07/21/23 10:56 AM  "

## 2024-05-11 ENCOUNTER — HEALTH MAINTENANCE LETTER (OUTPATIENT)
Age: 29
End: 2024-05-11